# Patient Record
Sex: FEMALE | Race: WHITE
[De-identification: names, ages, dates, MRNs, and addresses within clinical notes are randomized per-mention and may not be internally consistent; named-entity substitution may affect disease eponyms.]

---

## 2018-05-21 ENCOUNTER — HOSPITAL ENCOUNTER (OUTPATIENT)
Dept: HOSPITAL 39 - MRI | Age: 23
End: 2018-05-21
Attending: PHYSICIAN ASSISTANT
Payer: COMMERCIAL

## 2018-05-21 DIAGNOSIS — M51.36: Primary | ICD-10-CM

## 2018-05-21 DIAGNOSIS — M51.26: ICD-10-CM

## 2018-05-21 NOTE — MRI
EXAM DESCRIPTION: 

Lumbar Spine w/o Contrast : Magnetic Resonance Imaging.



CLINICAL HISTORY: 

LUMBAR RADICULOPATHY



COMPARISON: 

Thoracic spine radiographs 1/9/2018.



TECHNIQUE: 

Multiplanar, multiple standard sequences, non contrast MRI,

lumbar spine.



FINDINGS: 

L5-S1: Mild to moderate disc space loss. Disc desiccation.

Posterior right T2 signal in the disc margin with 3 mm bulge

abutting the thecal sac. Minimal hypertrophy of the left flavum

ligament and left facet arthrosis with mild canal narrowing. Mild

left foraminal narrowing. Right foramen patent.



L4-5: Normal signal in the disc and disc space preserved. No

significant bulging. Mild canal narrowing due to shortened bony

pedicles.  Bilateral foramina are patent. Posterior elements

unremarkable.



L3-4: Normal signal in the disc and normal disc space. Posterior

elements are unremarkable. Canal and foramina are patent.



L2-3: Normal signal in the disc and disc space preserved.

Posterior elements unremarkable. Canal and foramina are patent.



L1-2: Normal signal in the disc with disc space preserved.

Minimal concavity of the superior L2 endplate. Posterior elements

unremarkable. Canal and foramina are patent. Conus terminates at

L1.



T12-L1: Normal signal in the disc with disc space preserved.

Posterior elements unremarkable. Canal and foramina are patent.



L2-L5 levoscoliosis.  Paravertebral soft tissues unremarkable..

Normal marrow signal in the remaining vertebral bodies and the

posterior elements. Vertebral bodies are not compressed at any

level.



IMPRESSION: 

1. Mild to moderate disc space loss with posterior broad-based

disc bulge and annular fissure abutting the thecal sac. Minimal

hypertrophy of the flavum ligaments. Left facet arthrosis. Mild

left foraminal narrowing.

2. 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

3. Levoscoliosis lumbar spine.



Electronically signed by:  Chano Braxton MD  5/21/2018 2:48 PM CDT

Workstation: [id number]

## 2019-10-28 ENCOUNTER — HOSPITAL ENCOUNTER (OUTPATIENT)
Dept: HOSPITAL 39 - MRI | Age: 24
End: 2019-10-28
Attending: PAIN MEDICINE
Payer: COMMERCIAL

## 2019-10-28 DIAGNOSIS — M47.27: Primary | ICD-10-CM

## 2019-10-28 NOTE — MRI
PROVIDED CLINICAL HISTORY/REASON FOR EXAM: RADICULOPATHY 



TECHNIQUE:  Multiplanar, multisequence MRI examination performed

of the lumbar spine without intravenous contrast material.  



COMPARISON:  5/21/2018



FINDINGS: 



Five lumbar type vertebra are assumed. The designated L5/S1 disc

space is at axial T2 image 4.



Alignment:  2 mm degenerative retrolisthesis of L5 on S1.



Fracture: None present.



Paraspinal Soft Tissues:  Unremarkable.



Retroperitoneum:  Visible structures are unremarkable.



Conus Medullaris:  Termination at L1 level. Morphology is normal.



T12/L1:  No significant abnormality.



L1/2:  No significant abnormality.



L2/3:  No significant abnormality.



L3/4:  Mild facet hypertrophy. No significant stenosis.



L4/5:  Mild facet hypertrophy. No significant stenosis.



L5/S1:  Disc desiccation with loss of disc space height. Small

symmetric disc bulge with a superimposed annular fissure. No

significant central canal stenosis. Bilateral facet hypertrophy.

Mild left neural foraminal narrowing.

    



IMPRESSION:  

Similar acquired degenerative changes of the lumbar spine most

pronounced at L5/S1 as above.



Electronically signed by:  Bryce Kaur MD  10/28/2019 1:13 PM

CDT Workstation: [id number]